# Patient Record
Sex: MALE | Race: WHITE | NOT HISPANIC OR LATINO | Employment: PART TIME | URBAN - METROPOLITAN AREA
[De-identification: names, ages, dates, MRNs, and addresses within clinical notes are randomized per-mention and may not be internally consistent; named-entity substitution may affect disease eponyms.]

---

## 2024-07-29 PROBLEM — I70.0 AORTIC ATHEROSCLEROSIS: Status: ACTIVE | Noted: 2024-07-29

## 2024-10-23 ENCOUNTER — PATIENT MESSAGE (OUTPATIENT)
Dept: RESEARCH | Facility: HOSPITAL | Age: 64
End: 2024-10-23
Payer: MEDICARE

## 2025-05-07 ENCOUNTER — OFFICE VISIT (OUTPATIENT)
Dept: OTOLARYNGOLOGY | Facility: CLINIC | Age: 65
End: 2025-05-07
Payer: COMMERCIAL

## 2025-05-07 VITALS — WEIGHT: 113.75 LBS | BODY MASS INDEX: 17.24 KG/M2 | HEIGHT: 68 IN

## 2025-05-07 DIAGNOSIS — D17.0 LIPOMA OF NECK: Primary | ICD-10-CM

## 2025-05-07 DIAGNOSIS — R22.1 NECK MASS: ICD-10-CM

## 2025-05-07 PROCEDURE — 99204 OFFICE O/P NEW MOD 45 MIN: CPT | Mod: S$GLB,,, | Performed by: STUDENT IN AN ORGANIZED HEALTH CARE EDUCATION/TRAINING PROGRAM

## 2025-05-07 PROCEDURE — 99999 PR PBB SHADOW E&M-EST. PATIENT-LVL III: CPT | Mod: PBBFAC,,, | Performed by: STUDENT IN AN ORGANIZED HEALTH CARE EDUCATION/TRAINING PROGRAM

## 2025-05-07 PROCEDURE — 3288F FALL RISK ASSESSMENT DOCD: CPT | Mod: CPTII,S$GLB,, | Performed by: STUDENT IN AN ORGANIZED HEALTH CARE EDUCATION/TRAINING PROGRAM

## 2025-05-07 PROCEDURE — 1159F MED LIST DOCD IN RCRD: CPT | Mod: CPTII,S$GLB,, | Performed by: STUDENT IN AN ORGANIZED HEALTH CARE EDUCATION/TRAINING PROGRAM

## 2025-05-07 PROCEDURE — 1126F AMNT PAIN NOTED NONE PRSNT: CPT | Mod: CPTII,S$GLB,, | Performed by: STUDENT IN AN ORGANIZED HEALTH CARE EDUCATION/TRAINING PROGRAM

## 2025-05-07 PROCEDURE — 1101F PT FALLS ASSESS-DOCD LE1/YR: CPT | Mod: CPTII,S$GLB,, | Performed by: STUDENT IN AN ORGANIZED HEALTH CARE EDUCATION/TRAINING PROGRAM

## 2025-05-07 PROCEDURE — 3008F BODY MASS INDEX DOCD: CPT | Mod: CPTII,S$GLB,, | Performed by: STUDENT IN AN ORGANIZED HEALTH CARE EDUCATION/TRAINING PROGRAM

## 2025-05-07 NOTE — PROGRESS NOTES
Otolaryngology Clinic Note    Subjective:       Patient ID: Deuce Bucio is a 65 y.o. male.    Chief Complaint: Mass (Neck)      History of Present Illness: Deuce Bucio is a 65 y.o. male presenting with lump in the neck area, present for approximately 1+ years, which has grown slightly larger over this time period.  Reports he had ultrasound performed by his PCP suggested the mass might be a lipoma. Gets hoarse when tired. No pain. No swallowing issues. He is a current everyday smoker 1.5 ppd  He reports unexplained weight loss recently but has leveled off. Had scopes with [facility] for this last year. No other areas with lipomas or masses. No ear pain. Not coughing up blood. No breathing issues. No recent heart issues.       Past Surgical History:   Procedure Laterality Date    CHOLECYSTECTOMY      COLONOSCOPY N/A 05/06/2022    eardrum surgery      KNEE SURGERY      stomach sugery       Past Medical History:   Diagnosis Date    Hepatitis C     Pneumonia     Thyroiditis      Social Drivers of Health     Tobacco Use: High Risk (3/27/2025)    Patient History     Smoking Tobacco Use: Every Day     Smokeless Tobacco Use: Never     Passive Exposure: Not on file   Alcohol Use: Not At Risk (5/6/2025)    AUDIT-C     Frequency of Alcohol Consumption: Never     Average Number of Drinks: Patient does not drink     Frequency of Binge Drinking: Never   Financial Resource Strain: Low Risk  (5/6/2025)    Overall Financial Resource Strain (CARDIA)     Difficulty of Paying Living Expenses: Not very hard   Food Insecurity: No Food Insecurity (5/6/2025)    Hunger Vital Sign     Worried About Running Out of Food in the Last Year: Never true     Ran Out of Food in the Last Year: Never true   Transportation Needs: No Transportation Needs (5/6/2025)    PRAPARE - Transportation     Lack of Transportation (Medical): No     Lack of Transportation (Non-Medical): No   Physical Activity: Unknown (5/6/2025)    Exercise Vital Sign     Days of  Exercise per Week: 0 days     Minutes of Exercise per Session: Not on file   Stress: No Stress Concern Present (5/6/2025)    [employer] - Occupational Stress Questionnaire     Feeling of Stress : Not at all   Housing Stability: Low Risk  (5/6/2025)    Housing Stability Vital Sign     Unable to Pay for Housing in the Last Year: No     Number of Times Moved in the Last Year: Not on file     Homeless in the Last Year: No   Depression: Not at risk (12/18/2023)    [facility]    PHQ-2     PHQ-2 Score: 0   Utilities: Not At Risk (5/6/2025)    [facility] Utilities     Threatened with loss of utilities: No   Health Literacy: Adequate Health Literacy (5/6/2025)     Health Literacy     Frequency of need for help with medical instructions: Never   Social Isolation: Not on file     Review of patient's allergies indicates:  No Known Allergies  Current Outpatient Medications   Medication Instructions    aspirin (ECOTRIN) 81 mg, Daily    meloxicam (MOBIC) 15 mg, Daily PRN    mirtazapine (REMERON) 7.5 mg, Oral, Nightly         ENT ROS negative except as stated above.     Patient answers are not available for this visit.            Objective:      There were no vitals filed for this visit.    General: NAD, well appearing, thin  Eyes: Normal conjunctiva and lids  Face: symmetric, nerve intact  Nose: The nose is without any evidence of any deformity. The nasal mucosa is moist. The septum is midline. There is no evidence of septal hematoma. The turbinates are without abnormality.   Ears: The ears are with normal-appearing pinna. Examination of the canals is normal appearing bilaterally. There is no drainage or erythema noted. The tympanic membranes are normal appearing with pearly color, normal-appearing landmarks and normal light reflex. Hearing is grossly intact.  Mouth: No obvious abnormalities to the lips. The teeth  are unremarkable. The gingivae are without any obvious evidence of infection or lesion. The oral mucosa is moist and pink. There are no obvious masses to the hard or soft palate.   Oropharynx: The uvula is midline.  The tongue is midline. The posterior pharynx is without erythema or exudate. The tonsils are normal appearing.  Salivary glands: The salivary glands are symmetric and not enlarged, no masses  Neck: No lymphadenopathy, trachea midline, thryoid not enlarged. Has 2 cm fatty, rounded, well circumscribed mass to right of hyothyroid area, does not appear to go deep to any structures.   Psych: Normal mood and affect.   Neuro: Grossly intact  Speech: fluent      DIS US with reported possible lipoma     Assessment and Plan:       1. Lipoma of neck    2. Neck mass          Plan for excision neck lipoma in OR. Reviewed main anatomical concerns of hyoid and deeper throat/airway structures. Do not expect this to go deep, so no need for CT today.   Risks pain, bleeding, infection, scar, poor cosmesis, recurrence, damage to nearby structures, poor healing in setting of smoking.    RTC: 2 weeks post op    Plan of care was discussed in detail with the patient, who agreed with the plan as above. All questions were answered in detail.     Christen Navarro MD  Otolaryngology

## 2025-05-20 ENCOUNTER — RESULTS FOLLOW-UP (OUTPATIENT)
Dept: OTOLARYNGOLOGY | Facility: CLINIC | Age: 65
End: 2025-05-20

## 2025-05-28 ENCOUNTER — OFFICE VISIT (OUTPATIENT)
Dept: OTOLARYNGOLOGY | Facility: CLINIC | Age: 65
End: 2025-05-28
Payer: COMMERCIAL

## 2025-05-28 VITALS — BODY MASS INDEX: 16.94 KG/M2 | WEIGHT: 111.75 LBS | HEIGHT: 68 IN

## 2025-05-28 DIAGNOSIS — R22.1 NECK MASS: ICD-10-CM

## 2025-05-28 DIAGNOSIS — D17.0 LIPOMA OF NECK: Primary | ICD-10-CM

## 2025-05-28 PROCEDURE — 99024 POSTOP FOLLOW-UP VISIT: CPT | Mod: S$GLB,,, | Performed by: STUDENT IN AN ORGANIZED HEALTH CARE EDUCATION/TRAINING PROGRAM

## 2025-05-28 PROCEDURE — 3288F FALL RISK ASSESSMENT DOCD: CPT | Mod: CPTII,S$GLB,, | Performed by: STUDENT IN AN ORGANIZED HEALTH CARE EDUCATION/TRAINING PROGRAM

## 2025-05-28 PROCEDURE — 1101F PT FALLS ASSESS-DOCD LE1/YR: CPT | Mod: CPTII,S$GLB,, | Performed by: STUDENT IN AN ORGANIZED HEALTH CARE EDUCATION/TRAINING PROGRAM

## 2025-05-28 PROCEDURE — 1159F MED LIST DOCD IN RCRD: CPT | Mod: CPTII,S$GLB,, | Performed by: STUDENT IN AN ORGANIZED HEALTH CARE EDUCATION/TRAINING PROGRAM

## 2025-05-28 PROCEDURE — 1126F AMNT PAIN NOTED NONE PRSNT: CPT | Mod: CPTII,S$GLB,, | Performed by: STUDENT IN AN ORGANIZED HEALTH CARE EDUCATION/TRAINING PROGRAM

## 2025-05-28 PROCEDURE — 99999 PR PBB SHADOW E&M-EST. PATIENT-LVL III: CPT | Mod: PBBFAC,,, | Performed by: STUDENT IN AN ORGANIZED HEALTH CARE EDUCATION/TRAINING PROGRAM

## 2025-05-28 NOTE — PROGRESS NOTES
"ENT POST OP    Post op lipoma excision 5/15    Patient - VASYL BRAR                         - 1960 Sex - M  Med Rec # - [id number]                             Christen Thomas MD  The following is an electronic copy of report # [id number] from:  [facility]  Formerly named [facility] [place] [address]  Phone [phone]  DIAGNOSIS:  2025 JWH/zoran    SOFT TISSUE, FURTHER DESIGNATED "RIGHT NECK MASS," EXCISION:    --MATURE FIBROADIPOSE TISSUE COMPATIBLE WITH FIBROLIPOMA.__     S: Swelled up after surgery but getting smaller. No taste, tongue or swallowing issues.     O:   Neck with e/o small seroma, incision otherwise well healed, glue removed today.     A/P:   Fibrolipoma c/b seroma. Will resorb. No other concerns.   Reviewed path.   Contact as needed.     Christen Navarro MD    "